# Patient Record
Sex: FEMALE | Race: BLACK OR AFRICAN AMERICAN | NOT HISPANIC OR LATINO | URBAN - METROPOLITAN AREA
[De-identification: names, ages, dates, MRNs, and addresses within clinical notes are randomized per-mention and may not be internally consistent; named-entity substitution may affect disease eponyms.]

---

## 2023-11-03 ENCOUNTER — OFFICE (OUTPATIENT)
Dept: URBAN - METROPOLITAN AREA CLINIC 8 | Facility: CLINIC | Age: 28
End: 2023-11-03
Payer: MEDICAID

## 2023-11-03 VITALS
HEART RATE: 67 BPM | DIASTOLIC BLOOD PRESSURE: 66 MMHG | SYSTOLIC BLOOD PRESSURE: 118 MMHG | WEIGHT: 186 LBS | HEIGHT: 62 IN

## 2023-11-03 DIAGNOSIS — K92.2 GASTROINTESTINAL HEMORRHAGE, UNSPECIFIED: ICD-10-CM

## 2023-11-03 DIAGNOSIS — K21.9 GASTRO-ESOPHAGEAL REFLUX DISEASE WITHOUT ESOPHAGITIS: ICD-10-CM

## 2023-11-03 DIAGNOSIS — Z90.3 ACQUIRED ABSENCE OF STOMACH [PART OF]: ICD-10-CM

## 2023-11-03 DIAGNOSIS — R10.84 GENERALIZED ABDOMINAL PAIN: ICD-10-CM

## 2023-11-03 DIAGNOSIS — R13.10 DYSPHAGIA, UNSPECIFIED: ICD-10-CM

## 2023-11-03 DIAGNOSIS — K59.00 CONSTIPATION, UNSPECIFIED: ICD-10-CM

## 2023-11-03 DIAGNOSIS — R14.0 ABDOMINAL DISTENSION (GASEOUS): ICD-10-CM

## 2023-11-03 PROCEDURE — 99203 OFFICE O/P NEW LOW 30 MIN: CPT | Performed by: NURSE PRACTITIONER

## 2023-11-03 RX ORDER — PANTOPRAZOLE SODIUM 40 MG/1
40 TABLET, DELAYED RELEASE ORAL
Qty: 30 | Refills: 11 | Status: ACTIVE
Start: 2023-11-03

## 2023-11-03 NOTE — SERVICEHPINOTES
Ms. Unger presents today with chief complaint of GERD, dysphagia and constipation. She reports some intermittent dysphagia with solids in the throat region for the past 6 months. She reports "heartburn" several times per week since last year. She reports using Tums OTC with some relief. She reports some intermittent constipation since having her gastric sleeve surgery last year. She reports that she will go 3--4 days without a BM at times. She reports that she has been using Miralax and fiber with improvement in constipation. She reports some intermittent hematochezia with noticing BRB on the tissue and at times in the toilet after having a BM for the past one year. She denies rectal pain or protrusions. She reports some intermittent generalized abdominal pain since having her gastric sleeve surgery. She reports some bloating as well.